# Patient Record
Sex: MALE | Race: WHITE | NOT HISPANIC OR LATINO | URBAN - METROPOLITAN AREA
[De-identification: names, ages, dates, MRNs, and addresses within clinical notes are randomized per-mention and may not be internally consistent; named-entity substitution may affect disease eponyms.]

---

## 2020-07-22 ENCOUNTER — EMERGENCY (EMERGENCY)
Facility: HOSPITAL | Age: 45
LOS: 1 days | Discharge: HOME | End: 2020-07-22
Attending: EMERGENCY MEDICINE | Admitting: EMERGENCY MEDICINE
Payer: COMMERCIAL

## 2020-07-22 VITALS
WEIGHT: 190.04 LBS | TEMPERATURE: 99 F | SYSTOLIC BLOOD PRESSURE: 141 MMHG | DIASTOLIC BLOOD PRESSURE: 94 MMHG | OXYGEN SATURATION: 95 % | HEIGHT: 72 IN | RESPIRATION RATE: 18 BRPM | HEART RATE: 95 BPM

## 2020-07-22 PROCEDURE — 99282 EMERGENCY DEPT VISIT SF MDM: CPT

## 2020-07-22 NOTE — ED ADULT NURSE REASSESSMENT NOTE - NS ED NURSE REASSESS COMMENT FT1
Pt given gingerale as per MD Sun. Pt noted to have vomited up chicken and mushroom. Pt states relief in feeling in throat but has soreness still. MD Jade aware

## 2020-07-22 NOTE — ED ADULT TRIAGE NOTE - CHIEF COMPLAINT QUOTE
c/o FB sensation to esophagus after eating chicken marsala last night. as per pt, went UC last night, received a soft tissue x-ray which was negative. tried to drink water this morning and spit it out. +speaking in full sentences. no drooling noted. +airway patent.

## 2020-07-22 NOTE — ED ADULT NURSE NOTE - CHPI ED NUR SYMPTOMS NEG
no vomiting/no bleeding gums/no loss of consciousness/no numbness/no chills/no fever/no weakness/no nausea/no syncope

## 2020-07-22 NOTE — ED ADULT NURSE NOTE - NSIMPLEMENTINTERV_GEN_ALL_ED
Implemented All Universal Safety Interventions:  [place] to call system. Call bell, personal items and telephone within reach. Instruct patient to call for assistance. Room bathroom lighting operational. Non-slip footwear when patient is off stretcher. Physically safe environment: no spills, clutter or unnecessary equipment. Stretcher in lowest position, wheels locked, appropriate side rails in place.

## 2020-07-22 NOTE — ED PROVIDER NOTE - CLINICAL SUMMARY MEDICAL DECISION MAKING FREE TEXT BOX
Well appearing Patient in no acute distress with globus sensation who did not try carbonated beverages. Will monitor and keep doing carbonated beverages. If no relief, will do glucagon and consider CT and GI. Well appearing Patient in no acute distress with globus sensation who did not yet try carbonated beverages as a means of improvement. Consider glucagon, CT or GI if no improvement but while during ED stay after carbonated beverage intake, patient was able to evacuate foreign bodies from esophagus and now tolerates PO appropriately. will follow up with GI and return precautions educated and verbalized in return in show of understanding. Well appearing Patient in no acute distress with globus sensation who did not yet try carbonated beverages as a means of improvement. Consider glucagon, CT or GI if no improvement but while during ED stay after carbonated beverage intake, patient continued to decline glucagon and was able to evacuate foreign bodies from esophagus and now tolerates PO appropriately. will follow up with GI and return precautions educated and verbalized in return in show of understanding.

## 2020-07-22 NOTE — ED ADULT NURSE NOTE - OBJECTIVE STATEMENT
44 yo M c.o feeling of foreign body in throat since last night. Pt states "I had chicken marsala last night and dince feel like it hasn't gone all the way down". Pt exhibiting no sign of respiratory distress and able to speak full sentences at this time. Pt states he has had this happen before and tries to chew food enough so it does not happen. Has not seen gastroenterologist. Pt denies fever CP sob n/v/d

## 2020-07-22 NOTE — ED PROVIDER NOTE - CHPI ED SYMPTOMS NEG
no difficulty breathing, no difficulty speaking, no headache, no dizziness, no chest pain, no SOB, no drooling, no LE swelling

## 2020-07-22 NOTE — ED PROVIDER NOTE - CARE PROVIDER_API CALL
Casa Rodriguez  GASTROENTEROLOGY  [address] [place] [address]  Phone: [phone]  Fax: [phone]  Follow Up Time: 7-10 Days

## 2020-07-22 NOTE — ED PROVIDER NOTE - PATIENT PORTAL LINK FT
You can access the FollowMyHealth Patient Portal offered by [facility] by registering at the following website: [email or web address]. By joining [facility]’s FollowMyHealth portal, you will also be able to view your health information using other applications (apps) compatible with our system.

## 2020-07-22 NOTE — ED PROVIDER NOTE - PROGRESS NOTE DETAILS
Gave Patient ginger ale and took sip after which Patient choked and vomited a 2cm piece of chicken marsala. Patient vomited again and produced 4cm mushroom piece. Gave Patient ginger ale and took sip after which Patient had a mild choking sensation and vomited a 2cm piece of chicken marsala. Patient sipped on carbonated beverage and then vomited again, producing a 3-4cm mushroom piece. Pt feeling better, and now continues to tolerate PO appropriately. GI referral cards given.

## 2020-07-22 NOTE — ED PROVIDER NOTE - OBJECTIVE STATEMENT
44 y/o male with history of enlarged tonsil (L>R, never removed) presents to the ED with complaints of globus sensation in mid-esophagus after eating chicken marsala that he did not properly chew. Patient went to  last night where he had a negative x-ray and was reassured and sent home, but Patient is still concerned that there is something stuck in his throat. Patient has been drinking nothing but iced coffee and water since this morning and reports having a choking sensation. No difficulty breathing, difficulty speaking, headache, dizziness, chest pain, SOB, drooling, LE swelling, or diet changes. As per Patient, he has had this most of his life and has a brother with a similar history. Patient notes he was not careful in chewing his food this time around. 44 y/o male with childhood history of enlarged tonsil (L>R, never removed) presents to the ED with complaints of globus sensation in mid-esophagus after eating chicken marsala that he did not properly chew. Patient went to  last night where he had a negative x-ray and was reassured and sent home, but Patient is still concerned that there is something stuck in his throat. Patient has been drinking nothing but iced coffee and water since this morning and reports having a choking sensation after dromlomg. No difficulty breathing, difficulty speaking, headache, dizziness, chest pain, SOB, drooling, LE swelling, or diet changes. As per Patient, he has had this most of his life and has a brother with a similar history. Patient notes he was not careful in chewing his food this time around. 46 y/o male with childhood history of enlarged tonsil (L>R, never removed) presents to the ED with complaints of globus sensation in mid-esophagus after eating larger pieces chicken marsala that he did not properly chew. Patient went to  last night where he had a negative x-ray and was reassured and sent home, but Patient is still concerned that there is something stuck in his throat. Patient has been drinking nothing but iced coffee and water since this morning and reports having a choking sensation after drinking. No difficulty breathing, difficulty speaking, headache, dizziness, chest pain, SOB, drooling, LE swelling, or diet changes. As per Patient, he has had this most of his life and has a brother with a similar history. Patient notes he was not careful in chewing his food this time around.
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
## 2020-07-26 DIAGNOSIS — T18.128A FOOD IN ESOPHAGUS CAUSING OTHER INJURY, INITIAL ENCOUNTER: ICD-10-CM
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
## 2021-01-29 NOTE — ED PROVIDER NOTE - TOBACCO USE
Unknown if ever smoked
As certified below, I, or a nurse practitioner or physician assistant working with me, had a face-to-face encounter that meets the physician face-to-face encounter requirements.

## 2024-11-26 ENCOUNTER — EMERGENCY (EMERGENCY)
Facility: HOSPITAL | Age: 49
LOS: 1 days | Discharge: HOME | End: 2024-11-26
Admitting: EMERGENCY MEDICINE
Payer: COMMERCIAL

## 2024-11-26 VITALS
RESPIRATION RATE: 18 BRPM | OXYGEN SATURATION: 97 % | SYSTOLIC BLOOD PRESSURE: 145 MMHG | DIASTOLIC BLOOD PRESSURE: 96 MMHG | HEART RATE: 89 BPM | TEMPERATURE: 98 F

## 2024-11-26 VITALS
DIASTOLIC BLOOD PRESSURE: 94 MMHG | RESPIRATION RATE: 17 BRPM | TEMPERATURE: 98 F | HEART RATE: 101 BPM | WEIGHT: 199.96 LBS | SYSTOLIC BLOOD PRESSURE: 156 MMHG | HEIGHT: 72 IN | OXYGEN SATURATION: 98 %

## 2024-11-26 LAB
ALBUMIN SERPL ELPH-MCNC: 4.4 G/DL — SIGNIFICANT CHANGE UP (ref 3.4–5)
ALP SERPL-CCNC: 71 U/L — SIGNIFICANT CHANGE UP (ref 40–120)
ALT FLD-CCNC: 59 U/L — HIGH (ref 12–42)
ANION GAP SERPL CALC-SCNC: 8 MMOL/L — LOW (ref 9–16)
AST SERPL-CCNC: 30 U/L — SIGNIFICANT CHANGE UP (ref 15–37)
BASOPHILS # BLD AUTO: 0.07 K/UL — SIGNIFICANT CHANGE UP (ref 0–0.2)
BASOPHILS NFR BLD AUTO: 0.7 % — SIGNIFICANT CHANGE UP (ref 0–2)
BILIRUB SERPL-MCNC: 0.4 MG/DL — SIGNIFICANT CHANGE UP (ref 0.2–1.2)
BUN SERPL-MCNC: 12 MG/DL — SIGNIFICANT CHANGE UP (ref 7–23)
CALCIUM SERPL-MCNC: 9.8 MG/DL — SIGNIFICANT CHANGE UP (ref 8.5–10.5)
CHLORIDE SERPL-SCNC: 104 MMOL/L — SIGNIFICANT CHANGE UP (ref 96–108)
CO2 SERPL-SCNC: 30 MMOL/L — SIGNIFICANT CHANGE UP (ref 22–31)
CREAT SERPL-MCNC: 1.1 MG/DL — SIGNIFICANT CHANGE UP (ref 0.5–1.3)
D DIMER BLD IA.RAPID-MCNC: <187 NG/ML DDU — SIGNIFICANT CHANGE UP
EGFR: 82 ML/MIN/1.73M2 — SIGNIFICANT CHANGE UP
EOSINOPHIL # BLD AUTO: 0.22 K/UL — SIGNIFICANT CHANGE UP (ref 0–0.5)
EOSINOPHIL NFR BLD AUTO: 2.1 % — SIGNIFICANT CHANGE UP (ref 0–6)
GLUCOSE SERPL-MCNC: 107 MG/DL — HIGH (ref 70–99)
HCT VFR BLD CALC: 45.3 % — SIGNIFICANT CHANGE UP (ref 39–50)
HGB BLD-MCNC: 15.6 G/DL — SIGNIFICANT CHANGE UP (ref 13–17)
IMM GRANULOCYTES NFR BLD AUTO: 0.3 % — SIGNIFICANT CHANGE UP (ref 0–0.9)
LYMPHOCYTES # BLD AUTO: 2.56 K/UL — SIGNIFICANT CHANGE UP (ref 1–3.3)
LYMPHOCYTES # BLD AUTO: 24.2 % — SIGNIFICANT CHANGE UP (ref 13–44)
MAGNESIUM SERPL-MCNC: 2 MG/DL — SIGNIFICANT CHANGE UP (ref 1.6–2.6)
MCHC RBC-ENTMCNC: 30.8 PG — SIGNIFICANT CHANGE UP (ref 27–34)
MCHC RBC-ENTMCNC: 34.4 G/DL — SIGNIFICANT CHANGE UP (ref 32–36)
MCV RBC AUTO: 89.5 FL — SIGNIFICANT CHANGE UP (ref 80–100)
MONOCYTES # BLD AUTO: 0.77 K/UL — SIGNIFICANT CHANGE UP (ref 0–0.9)
MONOCYTES NFR BLD AUTO: 7.3 % — SIGNIFICANT CHANGE UP (ref 2–14)
NEUTROPHILS # BLD AUTO: 6.94 K/UL — SIGNIFICANT CHANGE UP (ref 1.8–7.4)
NEUTROPHILS NFR BLD AUTO: 65.4 % — SIGNIFICANT CHANGE UP (ref 43–77)
NRBC # BLD: 0 /100 WBCS — SIGNIFICANT CHANGE UP (ref 0–0)
PLATELET # BLD AUTO: 329 K/UL — SIGNIFICANT CHANGE UP (ref 150–400)
POTASSIUM SERPL-MCNC: 4.1 MMOL/L — SIGNIFICANT CHANGE UP (ref 3.5–5.3)
POTASSIUM SERPL-SCNC: 4.1 MMOL/L — SIGNIFICANT CHANGE UP (ref 3.5–5.3)
PROT SERPL-MCNC: 8.6 G/DL — HIGH (ref 6.4–8.2)
RBC # BLD: 5.06 M/UL — SIGNIFICANT CHANGE UP (ref 4.2–5.8)
RBC # FLD: 12 % — SIGNIFICANT CHANGE UP (ref 10.3–14.5)
SODIUM SERPL-SCNC: 142 MMOL/L — SIGNIFICANT CHANGE UP (ref 132–145)
TROPONIN I, HIGH SENSITIVITY RESULT: <4 NG/L — SIGNIFICANT CHANGE UP
WBC # BLD: 10.59 K/UL — HIGH (ref 3.8–10.5)
WBC # FLD AUTO: 10.59 K/UL — HIGH (ref 3.8–10.5)

## 2024-11-26 PROCEDURE — 99285 EMERGENCY DEPT VISIT HI MDM: CPT

## 2024-11-26 PROCEDURE — 71046 X-RAY EXAM CHEST 2 VIEWS: CPT | Mod: 26

## 2024-11-26 NOTE — ED PROVIDER NOTE - CARE PROVIDER_API CALL
Dean Ordonez  Cardiovascular Disease  [address] [place] [address], [address]  [place] [address]  Phone: [phone]  Fax: [phone]  Follow Up Time:

## 2024-11-26 NOTE — ED PROVIDER NOTE - OBJECTIVE STATEMENT
49-year-old male with no significant past medical history presents complaining of sensation of "pulled muscle" to the center of his chest that started yesterday after his flight from [place].  No alleviating or provoking factors.  Patient denies similar pain previously.  Patient went to urgent care today to be evaluated where he was noted to be hypertensive and sent to the emergency room for further evaluation.  Patient denies trauma, fall, shortness of breath, dizziness, syncope, cough, fever, back pain, diaphoresis, abdominal pain.  Denies recreational drug use.  Non-smoker.  Patient denies history of hypertension although reports that the last time his blood pressure was checked was "a long time ago".

## 2024-11-26 NOTE — ED ADULT NURSE NOTE - NSFALLUNIVINTERV_ED_ALL_ED
Bed/Stretcher in lowest position, wheels locked, appropriate side rails in place/Call bell, personal items and telephone in reach/Instruct patient to call for assistance before getting out of bed/chair/stretcher/Non-slip footwear applied when patient is off stretcher/[place] to call system/Physically safe environment - no spills, clutter or unnecessary equipment/Purposeful proactive rounding/Room/bathroom lighting operational, light cord in reach

## 2024-11-26 NOTE — ED PROVIDER NOTE - PROGRESS NOTE DETAILS
Improved BP. labs unremarkable, cxr clear. he is feeling be tter, patient looks well, advised f/u cards, return precautions discussed.

## 2024-11-26 NOTE — ED PROVIDER NOTE - PATIENT PORTAL LINK FT
You can access the FollowMyHealth Patient Portal offered by [facility] by registering at the following website: [email or web address]. By joining [facility]’s FollowMyHealth portal, you will also be able to view your health information using other applications (apps) compatible with our system.

## 2024-11-26 NOTE — ED ADULT NURSE NOTE - OBJECTIVE STATEMENT
UC for eval of intermittent chest tightness x yesterday with elevated BP. Sent for ACS workup.  Denies HA, vision changes. Denies h/o HTN.

## 2024-11-26 NOTE — ED PROVIDER NOTE - PHYSICAL EXAMINATION
CONSTITUTIONAL: Well-appearing; well-nourished; in no apparent distress.   	HEAD: Normocephalic; atraumatic.   	EYES:  clear  	ENT: normal nose; no rhinorrhea; normal pharynx with no erythema or lesions.   	NECK: Supple; non-tender;   	CARDIOVASCULAR: Normal S1, S2; no murmurs, rubs, or gallops. Regular rate and rhythm.   	RESPIRATORY: Breathing easily; breath sounds clear and equal bilaterally; no wheezes, rhonchi, or rales.  	GI: Soft; non-distended; non-tender  	EXT: GALDAMEZ x 4. normal gait.   	SKIN: Normal for age and race; warm; dry; good turgor; no apparent lesions or rash.   equal pulses bilaterally  	NEURO: A & O x 3; face symmetric; grossly unremarkable.   PSYCHOLOGICAL: The patient’s mood and manner are appropriate.

## 2024-11-26 NOTE — ED PROVIDER NOTE - CLINICAL SUMMARY MEDICAL DECISION MAKING FREE TEXT BOX
49-year-old male with no significant past medical history presents complaining of sensation of "pulled muscle" to the center of his chest that started yesterday after his flight from [place].  No alleviating or provoking factors.  Patient denies similar pain previously.  Patient went to urgent care today to be evaluated where he was noted to be hypertensive and sent to the emergency room for further evaluation.  Patient denies trauma, fall, shortness of breath, dizziness, syncope, cough, fever, back pain, diaphoresis, abdominal pain.  Denies recreational drug use.  Non-smoker.  Patient denies history of hypertension although reports that the last time his blood pressure was checked was "a long time ago".    looks well, /94, ekg NSR, no signs of ischemia, will obtain labs including trop, dimer, cxr.

## 2024-11-26 NOTE — ED ADULT TRIAGE NOTE - CHIEF COMPLAINT QUOTE
Pt sent in from  for eval of intermittent chest tightness x yesterday with elevated BP. Sent for ACS workup.  Denies HA, vision changes. Denies h/o HTN.

## 2024-11-27 PROBLEM — J35.1 HYPERTROPHY OF TONSILS: Chronic | Status: ACTIVE | Noted: 2020-07-22

## 2024-11-28 DIAGNOSIS — R07.89 OTHER CHEST PAIN: ICD-10-CM

## 2024-11-28 DIAGNOSIS — I10 ESSENTIAL (PRIMARY) HYPERTENSION: ICD-10-CM

## 2024-12-10 PROBLEM — Z00.00 ENCOUNTER FOR PREVENTIVE HEALTH EXAMINATION: Status: ACTIVE | Noted: 2024-12-10

## 2024-12-11 ENCOUNTER — APPOINTMENT (OUTPATIENT)
Dept: HEART AND VASCULAR | Facility: CLINIC | Age: 49
End: 2024-12-11
Payer: COMMERCIAL

## 2024-12-11 ENCOUNTER — NON-APPOINTMENT (OUTPATIENT)
Age: 49
End: 2024-12-11

## 2024-12-11 VITALS
BODY MASS INDEX: 29.93 KG/M2 | SYSTOLIC BLOOD PRESSURE: 130 MMHG | OXYGEN SATURATION: 96 % | DIASTOLIC BLOOD PRESSURE: 92 MMHG | HEART RATE: 95 BPM | WEIGHT: 221 LBS | HEIGHT: 72 IN

## 2024-12-11 DIAGNOSIS — I10 ESSENTIAL (PRIMARY) HYPERTENSION: ICD-10-CM

## 2024-12-11 DIAGNOSIS — R07.9 CHEST PAIN, UNSPECIFIED: ICD-10-CM

## 2024-12-11 PROCEDURE — 99204 OFFICE O/P NEW MOD 45 MIN: CPT

## 2024-12-11 PROCEDURE — G2211 COMPLEX E/M VISIT ADD ON: CPT | Mod: NC

## 2024-12-11 PROCEDURE — 93000 ELECTROCARDIOGRAM COMPLETE: CPT

## 2024-12-11 RX ORDER — AMLODIPINE BESYLATE 5 MG/1
5 TABLET ORAL DAILY
Qty: 30 | Refills: 5 | Status: ACTIVE | COMMUNITY
Start: 2024-12-11 | End: 1900-01-01

## 2024-12-23 ENCOUNTER — APPOINTMENT (OUTPATIENT)
Dept: HEART AND VASCULAR | Facility: CLINIC | Age: 49
End: 2024-12-23
Payer: COMMERCIAL

## 2024-12-23 ENCOUNTER — APPOINTMENT (OUTPATIENT)
Dept: CT IMAGING | Facility: CLINIC | Age: 49
End: 2024-12-23

## 2024-12-23 VITALS
HEART RATE: 105 BPM | BODY MASS INDEX: 30.37 KG/M2 | SYSTOLIC BLOOD PRESSURE: 129 MMHG | OXYGEN SATURATION: 96 % | DIASTOLIC BLOOD PRESSURE: 85 MMHG | WEIGHT: 224.25 LBS | HEIGHT: 72 IN | TEMPERATURE: 97.3 F

## 2024-12-23 DIAGNOSIS — I10 ESSENTIAL (PRIMARY) HYPERTENSION: ICD-10-CM

## 2024-12-23 PROCEDURE — 93306 TTE W/DOPPLER COMPLETE: CPT

## 2024-12-23 PROCEDURE — 99214 OFFICE O/P EST MOD 30 MIN: CPT

## 2024-12-23 PROCEDURE — G2211 COMPLEX E/M VISIT ADD ON: CPT | Mod: NC

## 2025-01-03 DIAGNOSIS — R07.9 CHEST PAIN, UNSPECIFIED: ICD-10-CM

## 2025-02-18 NOTE — ED PROVIDER NOTE - NSFOLLOWUPINSTRUCTIONS_ED_ALL_ED_FT
show FOOD IMPACTION - AfterCare(R) Instructions(ER/ED)     Food Impaction    WHAT YOU NEED TO KNOW:    Food impaction occurs when food (often meat or fish bones) becomes stuck in your esophagus. Food impaction can occur if your esophagus does not function normally. Food impaction may also happen if you do not have teeth or do not chew your food completely.     DISCHARGE INSTRUCTIONS:    Return to the emergency department if:     You are unable to swallow your saliva.      Your breathing is noisy.      You have trouble breathing.      You have repeated vomiting or blood in your vomit.      You have pain in your chest or abdomen.    When should I contact my healthcare provider?     You feel you have something stuck in your throat.      You choke or vomit after eating.      You have questions or concerns about your condition or care.     Follow up with your healthcare provider as directed: You may need tests to check your ability to swallow. Write down your questions so you remember to ask them during your visits.    Prevent food impaction:     Sit up while you eat. Raise the head of your bed if you need to eat in bed.      Make sure your dentures fit well. Poorly fitting dentures make it difficult to chew.       Chew your food completely. Proper chewing will help with swallowing.       Drink water with meals. Water will help move food down to your stomach.          © Copyright [employer] 2020       back to top                      © Copyright [employer] 2020

## 2025-03-07 ENCOUNTER — APPOINTMENT (OUTPATIENT)
Dept: HEART AND VASCULAR | Facility: CLINIC | Age: 50
End: 2025-03-07
Payer: COMMERCIAL

## 2025-03-07 VITALS
DIASTOLIC BLOOD PRESSURE: 76 MMHG | TEMPERATURE: 98.4 F | WEIGHT: 212 LBS | HEART RATE: 112 BPM | BODY MASS INDEX: 28.71 KG/M2 | SYSTOLIC BLOOD PRESSURE: 114 MMHG | OXYGEN SATURATION: 96 % | HEIGHT: 72 IN

## 2025-03-07 DIAGNOSIS — I10 ESSENTIAL (PRIMARY) HYPERTENSION: ICD-10-CM

## 2025-03-07 DIAGNOSIS — R07.9 CHEST PAIN, UNSPECIFIED: ICD-10-CM

## 2025-03-07 PROCEDURE — 93015 CV STRESS TEST SUPVJ I&R: CPT

## 2025-03-07 PROCEDURE — 99214 OFFICE O/P EST MOD 30 MIN: CPT | Mod: 25

## 2025-03-07 RX ORDER — VALSARTAN 80 MG/1
80 TABLET, COATED ORAL DAILY
Qty: 30 | Refills: 3 | Status: ACTIVE | COMMUNITY
Start: 2025-03-07 | End: 1900-01-01

## 2025-03-08 ENCOUNTER — TRANSCRIPTION ENCOUNTER (OUTPATIENT)
Age: 50
End: 2025-03-08

## 2025-04-24 ENCOUNTER — APPOINTMENT (OUTPATIENT)
Dept: HEART AND VASCULAR | Facility: CLINIC | Age: 50
End: 2025-04-24

## 2025-04-25 ENCOUNTER — APPOINTMENT (OUTPATIENT)
Dept: HEART AND VASCULAR | Facility: CLINIC | Age: 50
End: 2025-04-25